# Patient Record
Sex: FEMALE | Race: WHITE | Employment: OTHER | URBAN - METROPOLITAN AREA
[De-identification: names, ages, dates, MRNs, and addresses within clinical notes are randomized per-mention and may not be internally consistent; named-entity substitution may affect disease eponyms.]

---

## 2018-04-11 NOTE — PATIENT DISCUSSION
Patient advised of the right to post-operative care by the surgeon. Patient is fully informed of, and agreed to, co-management with their primary optometric physician. Post-operative care by the surgeon is not medically necessary and co-management is clinically appropriate. Patient has received itemization of fees related to cataract surgery. Transfer of care letter completed for the patient. Transfer care of Right eye to Dr. Adeline Daigle on 4/11/2018. Patient instructed to call immediately if any new distortion, blurring, decreased vision or eye pain.

## 2021-08-26 ENCOUNTER — ESTABLISHED COMPREHENSIVE EXAM (OUTPATIENT)
Dept: URBAN - METROPOLITAN AREA CLINIC 35 | Facility: CLINIC | Age: 66
End: 2021-08-26

## 2021-08-26 DIAGNOSIS — H25.813: ICD-10-CM

## 2021-08-26 DIAGNOSIS — Z83.518: ICD-10-CM

## 2021-08-26 DIAGNOSIS — H04.123: ICD-10-CM

## 2021-08-26 DIAGNOSIS — Z83.511: ICD-10-CM

## 2021-08-26 PROCEDURE — 92015 DETERMINE REFRACTIVE STATE: CPT

## 2021-08-26 PROCEDURE — 92014 COMPRE OPH EXAM EST PT 1/>: CPT

## 2021-08-26 PROCEDURE — 92134 CPTRZ OPH DX IMG PST SGM RTA: CPT

## 2021-08-26 ASSESSMENT — KERATOMETRY
OD_AXISANGLE2_DEGREES: 074
OD_K2POWER_DIOPTERS: 44.75
OD_AXISANGLE_DEGREES: 164
OD_K1POWER_DIOPTERS: 45.00
OS_AXISANGLE_DEGREES: 24
OS_K2POWER_DIOPTERS: 44.50
OS_AXISANGLE2_DEGREES: 114
OS_K1POWER_DIOPTERS: 45.00

## 2021-08-26 ASSESSMENT — VISUAL ACUITY
OS_PH: 20/50-1
OS_CC: 20/60-1
OD_CC: J8
OD_CC: 20/60-1
OD_PH: 20/50-2
OS_CC: J8-

## 2021-08-26 ASSESSMENT — TONOMETRY
OS_IOP_MMHG: 15
OD_IOP_MMHG: 15

## 2024-01-09 ENCOUNTER — NEW PATIENT (OUTPATIENT)
Dept: URBAN - METROPOLITAN AREA CLINIC 39 | Facility: CLINIC | Age: 69
End: 2024-01-09

## 2024-01-09 DIAGNOSIS — H18.513: ICD-10-CM

## 2024-01-09 DIAGNOSIS — H25.811: ICD-10-CM

## 2024-01-09 DIAGNOSIS — H04.123: ICD-10-CM

## 2024-01-09 DIAGNOSIS — H25.813: ICD-10-CM

## 2024-01-09 DIAGNOSIS — H52.13: ICD-10-CM

## 2024-01-09 PROCEDURE — V2799PMN IMPRIMIS PRED-MOXI-NEPAF 5ML

## 2024-01-09 PROCEDURE — 92136 OPHTHALMIC BIOMETRY: CPT

## 2024-01-09 PROCEDURE — 92286 ANT SGM IMG I&R SPECLR MIC: CPT

## 2024-01-09 PROCEDURE — 92134 CPTRZ OPH DX IMG PST SGM RTA: CPT

## 2024-01-09 PROCEDURE — 92015 DETERMINE REFRACTIVE STATE: CPT

## 2024-01-09 PROCEDURE — 92025-1 CORNEAL TOPOGRAPHY, INS

## 2024-01-09 PROCEDURE — 99204 OFFICE O/P NEW MOD 45 MIN: CPT

## 2024-01-09 ASSESSMENT — VISUAL ACUITY
OD_CC: 20/30-1
OS_BAT: 20/100
OS_SC: CF 6FT
OS_CC: 20/50
OD_SC: CF 6FT
OD_SC: J1
OS_AM: 20/30
OD_BAT: 20/100
OD_RAM: 20/30
OS_CC: J3
OD_CC: J5
OS_SC: J1

## 2024-01-09 ASSESSMENT — TONOMETRY
OS_IOP_MMHG: 15
OD_IOP_MMHG: 17

## 2024-02-12 ENCOUNTER — SURGERY/PROCEDURE (OUTPATIENT)
Facility: LOCATION | Age: 69
End: 2024-02-12

## 2024-02-12 ENCOUNTER — PRE-OP/H&P (OUTPATIENT)
Dept: URBAN - METROPOLITAN AREA CLINIC 39 | Facility: CLINIC | Age: 69
End: 2024-02-12

## 2024-02-12 DIAGNOSIS — H18.513: ICD-10-CM

## 2024-02-12 DIAGNOSIS — H25.811: ICD-10-CM

## 2024-02-12 DIAGNOSIS — H25.813: ICD-10-CM

## 2024-02-12 DIAGNOSIS — H04.123: ICD-10-CM

## 2024-02-12 PROCEDURE — 99211T TECH SERVICE

## 2024-02-12 PROCEDURE — 66999LNSR LENSAR LASER FOR CAT SX

## 2024-02-12 PROCEDURE — 65772LRI LRI DURING CAT SX

## 2024-02-12 PROCEDURE — 6698454AV REMOVE CATARACT, INSERT ADVANCED LENS (SX ONLY)

## 2024-02-12 PROCEDURE — 99199PAV PROF ADVANCED VISION PACKAGE

## 2024-02-13 ENCOUNTER — POST OP/EVAL OF SECOND EYE (OUTPATIENT)
Dept: URBAN - METROPOLITAN AREA CLINIC 39 | Facility: CLINIC | Age: 69
End: 2024-02-13

## 2024-02-13 ENCOUNTER — SURGERY/PROCEDURE (OUTPATIENT)
Dept: URBAN - METROPOLITAN AREA SURGERY 14 | Facility: SURGERY | Age: 69
End: 2024-02-13

## 2024-02-13 ENCOUNTER — POST-OP (OUTPATIENT)
Dept: URBAN - METROPOLITAN AREA CLINIC 39 | Facility: CLINIC | Age: 69
End: 2024-02-13

## 2024-02-13 DIAGNOSIS — Z96.1: ICD-10-CM

## 2024-02-13 DIAGNOSIS — H25.811: ICD-10-CM

## 2024-02-13 DIAGNOSIS — H04.123: ICD-10-CM

## 2024-02-13 DIAGNOSIS — H18.513: ICD-10-CM

## 2024-02-13 DIAGNOSIS — H25.812: ICD-10-CM

## 2024-02-13 PROCEDURE — 6698454AV REMOVE CATARACT, INSERT ADVANCED LENS (SX ONLY)

## 2024-02-13 PROCEDURE — 66999LNSR LENSAR LASER FOR CAT SX

## 2024-02-13 PROCEDURE — 99211T TECH SERVICE

## 2024-02-13 PROCEDURE — P6698455 NON-COMANAGED ADVANCED PO

## 2024-02-13 PROCEDURE — 65772LRI LRI DURING CAT SX

## 2024-02-13 PROCEDURE — 92012 INTRM OPH EXAM EST PATIENT: CPT

## 2024-02-13 PROCEDURE — 99199PAV PROF ADVANCED VISION PACKAGE

## 2024-02-13 ASSESSMENT — VISUAL ACUITY
OS_SC: 20/30-1
OS_SC: CF 6FT
OD_SC: J3
OD_SC: 20/40
OD_SC: J3
OS_BAT: 20/100
OS_SC: J1
OD_SC: 20/40
OS_AM: 20/30

## 2024-02-13 ASSESSMENT — TONOMETRY
OD_IOP_MMHG: 20
OS_IOP_MMHG: 17

## 2024-03-12 ENCOUNTER — SURGERY/PROCEDURE (OUTPATIENT)
Facility: LOCATION | Age: 69
End: 2024-03-12

## 2024-03-12 ENCOUNTER — CONSULTATION/EVALUATION (OUTPATIENT)
Dept: URBAN - METROPOLITAN AREA CLINIC 39 | Facility: CLINIC | Age: 69
End: 2024-03-12

## 2024-03-12 DIAGNOSIS — H18.513: ICD-10-CM

## 2024-03-12 DIAGNOSIS — H52.7: ICD-10-CM

## 2024-03-12 DIAGNOSIS — H26.493: ICD-10-CM

## 2024-03-12 DIAGNOSIS — H26.491: ICD-10-CM

## 2024-03-12 PROCEDURE — 92025 CPTRIZED CORNEAL TOPOGRAPHY: CPT

## 2024-03-12 PROCEDURE — 92134 CPTRZ OPH DX IMG PST SGM RTA: CPT

## 2024-03-12 PROCEDURE — 6682154 YAG CAPSULOTOMY(SX ONLY)

## 2024-03-12 PROCEDURE — 99024 POSTOP FOLLOW-UP VISIT: CPT

## 2024-03-12 ASSESSMENT — TONOMETRY
OD_IOP_MMHG: 14
OS_IOP_MMHG: 13

## 2024-03-12 ASSESSMENT — VISUAL ACUITY
OD_BAT: 20/200
OS_BAT: 20/40
OD_SC: 20/70
OD_PH: 20/30-2
OS_SC: 20/20-1
OD_SC: J1+
OS_SC: J1+(-2)